# Patient Record
Sex: FEMALE
[De-identification: names, ages, dates, MRNs, and addresses within clinical notes are randomized per-mention and may not be internally consistent; named-entity substitution may affect disease eponyms.]

---

## 2019-12-05 ENCOUNTER — HOSPITAL ENCOUNTER (EMERGENCY)
Dept: HOSPITAL 97 - ER | Age: 59
Discharge: HOME | End: 2019-12-05
Payer: COMMERCIAL

## 2019-12-05 VITALS — DIASTOLIC BLOOD PRESSURE: 78 MMHG | SYSTOLIC BLOOD PRESSURE: 116 MMHG

## 2019-12-05 VITALS — OXYGEN SATURATION: 95 % | TEMPERATURE: 98.4 F

## 2019-12-05 DIAGNOSIS — F17.210: ICD-10-CM

## 2019-12-05 DIAGNOSIS — M79.652: Primary | ICD-10-CM

## 2019-12-05 DIAGNOSIS — Z88.5: ICD-10-CM

## 2019-12-05 PROCEDURE — 93970 EXTREMITY STUDY: CPT

## 2019-12-05 PROCEDURE — 99283 EMERGENCY DEPT VISIT LOW MDM: CPT

## 2019-12-05 PROCEDURE — 81003 URINALYSIS AUTO W/O SCOPE: CPT

## 2019-12-05 NOTE — XMS REPORT
Summary of Care

 Created on:2019



Patient:Tena Sanz

Sex:Female

:1960

External Reference #:[id number]





Demographics







 Address  [address] [place] [address]

 

 Mobile Phone  [phone]

 

 Home Phone  [phone]

 

 Work Phone  [phone]

 

 Email Address  [email or web address]

 

 Preferred Language  English

 

 Marital Status  

 

 Nondenominational Affiliation  Unknown

 

 Race  White

 

 Ethnic Group  Not  or 









Author







 Organization  [facility]

 

 Address  [address] [place] [address]









Support







 Name  Relationship  Address  Phone

 

 Owen Sanz  Unavailable  Unavailable  [phone]









Care Team Providers







 Name  Role  Phone

 

 Pcp, Patient Does Not Have A  Primary Care Provider  [phone]









Reason for Visit







 Reason  Comments

 

 Leg Pain  



Auth/Cert





 Status  Reason  Specialty  Diagnoses /  Referred By  Referred To



       Procedures  Contact  Contact

 

     Emergency Medicine  Diagnoses



GROIN PAIN INTO LEG    [facility]



           Dept







           [address] [place] [address]



           [place]



           [address]







           Phone:



           [phone]







           Fax: [phone]









Encounter Details







 Date  Type  Department  Care Team  Description

 

 2019  Emergency  [facility]  Obed Rinaldi III,  Sprain of groin,



     Department



  PA



  initial encounter



     [address] [place] [address] 



  [address] [place] [address] 



  (Primary Dx)



     [place] [address]



  [place], [place] [address] [phone] (Fax)  







Allergies







 Active Allergy  Reactions  Severity  Noted Date  Comments

 

 Naproxen Sodium  Nausea and/or Vomiting    2015  

 

 Codeine  Nausea and/or Vomiting    2015  



documented as of this encounter (statuses as of 2019)



Medications







 Medication  Sig  Dispensed  Refills  Start Date  End Date  Status

 

 doxycycline  Take 1 Cap by  20 Cap  0  2015    Active



 (VIBRAMYCIN) 100 mg  mouth 2 (two)          



 capsule  times daily.          

 

 albuterol (VENTOLIN)  Inhale 2 Puffs  1 Inhaler  0  2015    Active



 90 mcg/actuation  every 4 (four)          



 inhaler  hours as          



   needed for          



   Wheezing or          



   Shortness of          



   Breath.          

 

 albuterol (PROAIR)  Inhale 2 Puffs  8.5 g  3  2016    Active



 90 mcg/actuation  every 4 (four)          



 inhaler  hours as          



   needed for          



   Wheezing,          



   Shortness of          



   Breath or          



   Bronchospasm.          

 

 benzonatate 100 mg  Take 1 capsule  20 capsule  0  2018    Active



 capsule  by mouth 3          



   (three) times          



   daily as          



   needed for          



   Cough.          

 

 albuterol 2.5 mg /3  Inhale 3 mL  1 Box  0  2018    Active



 mL (0.083 %)  every 4 (four)          



 nebulizer solution  hours as          



   needed for          



   Wheezing or          



   Shortness of          



   Breath.          

 

 albuterol 90  Inhale 2 Puffs  8.5 g  0  2018    Active



 mcg/actuation  every 4 (four)          



 inhaler  hours as          



   needed for          



   Wheezing or          



   Shortness of          



   Breath.          

 

 predniSONE 10 mg  Take 4 tablets  12 tablet  0  2019  Active



 tabletIndications:  by mouth daily        9  



 Sprain of groin,  for 3 days.          



 initial encounter            

 

 traMADol (ULTRAM) 50  Take 1 tablet  9 tablet  0  2019    Active



 mg  by mouth every          



 tabletIndications:  8 (eight)          



 Sprain of groin,  hours as          



 initial encounter  needed for          



   Pain (scale          



   4-6).          

 

 acetaminophen  Take 3 tablets  60 tablet  0  2019  Active



 (TYLENOL) 325 mg  by mouth 4        9  



 tabletIndications:  (four) times          



 Sprain of groin,  daily for 5          



 initial encounter  days. This is          



   the maximum          



   safe dose for          



   a healthy          



   adult.          

 

 cyclobenzaprine 5 mg  Take 1 tablet  9 tablet  0  2019    Active



 tabletIndications:  by mouth 3          



 Sprain of groin,  (three) times          



 initial encounter  daily.          

 

 predniSONE 20 mg  1 PO BID x 4  8 tablet  0  2018  
Discontinued



 tablet  days        9  



documented as of this encounter (statuses as of 2019)



Active Problems

No known active problemsdocumented as of this encounter (statuses as of 2019)



Social History







 Tobacco Use  Types  Packs/Day  Years Used  Date

 

 Never Assessed        









 Sex Assigned at Birth  Date Recorded

 

 Not on file  









 Job Start Date  Occupation  Industry

 

 Not on file  Not on file  Not on file









 Travel History  Travel Start  Travel End









 No recent travel history available.



documented as of this encounter



Last Filed Vital Signs







 Vital Sign  Reading  Time Taken  Comments

 

 Blood Pressure  129/91  2019  4:45 PM CDT  

 

 Pulse  84  2019  4:45 PM CDT  

 

 Temperature  36.4 C (97.5 F)  2019  4:45 PM CDT  

 

 Respiratory Rate  18  2019  4:45 PM CDT  

 

 Oxygen Saturation  99%  2019  4:45 PM CDT  

 

 Inhaled Oxygen Concentration  -  -  

 

 Weight  74.8 kg (165 lb)  2019  4:45 PM CDT  

 

 Height  -  -  

 

 Body Mass Index  29.23  2018  1:46 PM CDT  



documented in this encounter



Discharge Instructions

Obed Sanchez III, [place] - 2019Formatting of this note might 
be different from the original.





@@@@@@@@@@@@@@@@@@@@@@@@@@@@@@@@@@@@@@@@@@@@@@@@@@@@@

[facility]

RETURN TO WORK / SCHOOL EXCUSE



Tena Sanz WAS SEEN IN THE ER AND DISCHARGED 2019

TODAY, 4:55 PM  &amp;

May return to Work / School / Incarceration on  with No limitations 
unless indicated below.

___The following limitations apply until pt is seen by Physician and cleared to 
return to normal activity.

___ Light duty

___ No Sports

___ No work

___ Do not return until fever free for 24 hours.

___ No school



Ed Gentry [place]

[facility]

[address] [place] [address] [place] [address]



If you are unprepared to return to work tomorrow due to pain please give this 
note to your employer and make a follow up appointment with your MD for further 
evaluation and limitations.



###  The patient may have been given Narcotic pain medications during their 
stay in the ED that may show up on a Drug Screen. The hospital discharge paper 
work will identify these medications.



@@@@@@@@@@@@@@@@@@@@@@@@@@@@@@@@@@@@@@@@@@@@@@@@@@@@@



Thank you for trusting us with your care.



The emergency room is the first stop in the medical management of your 
complaint .



Our primary pupose is to identify life threatening emergancies and to rapidly 
address those issues.



We are releasing you today after evaluation for emergency or life threatening 
problems related to your complaint. At this time we are comfortable that your 
condition is stable enough to go home, take oral medications and follow up for 
further care.



If you can't afford a doctor OR MEDICATIONS consider

Clinic

[facility], [address], [place]; [phone]



Medications

[facility] WILL SHOW YOU WHERE YOU CAN GET YOUR MEDICATIONS CHEAPEST.



1. Call your doctor and let them know you were seen for

  ICD-10-CM ICD-9-CM

1. Sprain of groin, initial encounter S33.8XXA 848.8



2. Schedule a follow up  within 3 days of your ER visit.



3. Take your prescriptions to the pharmacy and get them filled today.



4. Take the medications as prescribed and until completed.



5. You have been referred for further care



6. You may need additional tests Your doctors will help you figure out what you 
need and how to get them done.



7. Please read all paperwork provided to you.



Additional instructions

See Attached





AttachmentsThe following attachments cannot be sent through Care 
Everywhere.Getting Into and Out of Bed, Back Safety (English)Strains and Sprains
, Treating (English)documented in this encounter



Plan of Treatment







 Health Maintenance  Due Date  Last Done  Comments

 

 HEPATITIS C (HCV) SCREEN  1960    

 

 PNEUMOCOCCAL 0-64 YEARS COMBINED SERIES (1 of 1 -  1965    



 PPSV23)      

 

 DTaP,Tdap,and Td Vaccines (1 - Tdap)  1978    

 

 PAP SMEAR  1980    

 

 MAMMOGRAM  1999    

 

 COLONOSCOPY  2009    

 

 Zoster Recombinant Vaccine (SHINGRIX) (1 of 2)  2009    

 

 INFLUENZA VACCINE (Retired version)  2019    



documented as of this encounter



Procedures







 Procedure Name  Priority  Date/Time  Associated Diagnosis  Comments

 

 NOTICE OF PRIVACY  Routine  2019  4:45 PM CDT    



 PRACTICES        

 

 CONSENT/REFUSAL FOR  Routine  2019  4:38 PM CDT    



 DIAGNOSIS AND TREATMENT        



documented in this encounter



Results

Not on filedocumented in this encounter



Visit Diagnoses







 Diagnosis

 

 Sprain of groin, initial encounter - Primary



documented in this encounter



Insurance







 Payer  Benefit Plan /  Subscriber ID  Effective Dates  Phone  Address  Type



   Group          

 

 UNITED  UNITED  [id number]  [facility]    t      S









 Guarantor Name  Account Type  Relation to  Date of  Phone  Billing



     Patient  Birth    Address

 

 Tena Sanz  Personal/Family  Self  1960  [phone]  [address]







         (Home)



  [place], [place]



         [phone]  [id number]



         (Work)  



documented as of this encounter

## 2019-12-05 NOTE — XMS REPORT
Patient Summary Document

 Created on:2019



Patient:JOVITA CARR

Sex:Female

:1960

External Reference #:[id number]





Demographics







 Address  [address] [place] [address]

 

 Home Phone  [phone]

 

 Work Phone  [phone]

 

 Preferred Language  Unknown

 

 Marital Status  Unknown

 

 Gnosticism Affiliation  Unknown

 

 Race  Unknown

 

 Additional Race(s)  Unavailable

 

 Ethnic Group  Unknown









Author







 Organization  [facility]

 

 Address  [facility] [place] [address] [place] [address]

 

 Phone  [phone]









Care Team Providers







 Name  Role  Phone

 

 Unavailable  Unavailable  Unavailable









Problems

This patient has no known problems.



Allergies, Adverse Reactions, Alerts

This patient has no known allergies or adverse reactions.



Medications

This patient has no known medications.

## 2019-12-05 NOTE — RAD REPORT
EXAM DESCRIPTION:  USExtrem Venous W Compress Bil12/5/2019 3:01 pm

 

CLINICAL HISTORY:  Leg pain

 

COMPARISON:  none

 

FINDINGS:  The common femoral, superficial femoral, popliteal and posterior tibial veins bilaterally 
are compressible and demonstrate augmentation.

 

Doppler demonstrates good flow.

 

IMPRESSION:  No evidence of deep venous thrombosis involving  either lower extremity.

## 2019-12-05 NOTE — ER
Nurse's Notes                                                                                     

 [facility]                                                                 

Name: Tena Sanz                                                                             

Age: 59 yrs                                                                                       

Sex: Female                                                                                       

: 1960                                                                                   

MRN: [id number]                                                                                   

Arrival Date: 2019                                                                          

Time: 13:22                                                                                       

Account#: [id number]                                                                            

Bed 19                                                                                            

Private MD:                                                                                       

Diagnosis: Low back pain;Pain in left thigh                                                       

                                                                                                  

Presentation:                                                                                     

                                                                                             

14:02 Presenting complaint: Patient states: Right ankle swelling started yesterday, denies    jl7 

      trauma, reports standing a lot at work. Reports left low back pain when standing at         

      work for the past month. Reports left upper thigh pain started this morning. Transition     

      of care: patient was not received from another setting of care. Onset of symptoms was       

      2019. Risk Assessment: Do you want to hurt yourself or someone else?           

      Patient reports no desire to harm self or others. Initial Sepsis Screen: Does the           

      patient meet any 2 criteria? No. Patient's initial sepsis screen is negative. Does the      

      patient have a suspected source of infection? No. Patient's initial sepsis screen is        

      negative. Care prior to arrival: None.                                                      

14:02 Method Of Arrival: Ambulatory                                                           [facility] 

14:02 Acuity: MITCH 4                                                                           jl7 

                                                                                                  

Historical:                                                                                       

- Allergies:                                                                                      

14:04 Codeine;                                                                                jl7 

- Home Meds:                                                                                      

14:04 None [Active];                                                                          jl7 

- PMHx:                                                                                           

14:04 None;                                                                                   jl7 

- PSHx:                                                                                           

14:04 None;                                                                                   jl7 

                                                                                                  

- Immunization history:: Adult Immunizations not up to date.                                      

- Social history:: Smoking status: Patient uses tobacco products, smokes one-half pack            

  cigarettes per day.                                                                             

- Ebola Screening: : No symptoms or risks identified at this time.                                

                                                                                                  

                                                                                                  

Screenin:15 Abuse screen: Denies threats or abuse. Denies injuries from another. Nutritional        ca1 

      screening: No deficits noted. Tuberculosis screening: No symptoms or risk factors           

      identified. Fall Risk Gait- Impaired (20 pts.).                                             

                                                                                                  

Assessment:                                                                                       

14:15 General: Appears in no apparent distress. comfortable, Behavior is calm, cooperative,   ca1 

      appropriate for age. Pain: Complains of pain in left quadriceps, left lower back Pain       

      currently is 7 out of 10 on a pain scale. at worst was 12 out of 10 on a pain scale.        

      Pain began this morning. Pain: Aggravated by weight bearing. Neuro: Level of                

      Consciousness is awake, alert, obeys commands, Oriented to person, place, time,             

      situation, Appropriate for age. Cardiovascular: Heart tones S1 S2 present Capillary         

      refill < 3 seconds Patient's skin is warm and dry. Respiratory: Airway is patent            

      Respiratory effort is even, unlabored, Respiratory pattern is regular, symmetrical,         

      Breath sounds are clear bilaterally. GI: Abdomen is flat, non-distended, Bowel sounds       

      present X 4 quads. Abd is soft and non tender X 4 quads. : Urine is clear. EENT: No       

      deficits noted. No signs and/or symptoms were reported regarding the EENT system. Derm:     

      Skin is intact, is healthy with good turgor, Skin is pink, warm \T\ dry. Musculoskeletal:   

      Circulation, motion, and sensation intact. Capillary refill < 3 seconds, Swelling           

      present in right ankle, medial aspect of right foot, right knee and right shin.             

14:56 Reassessment: Patient appears in no apparent distress at this time. Patient is alert,   ca1 

      oriented x 3, equal unlabored respirations, skin warm/dry/pink. Ultrasound at bedside.      

                                                                                                  

Vital Signs:                                                                                      

14:04  / 87; Pulse 88; Resp 19 S; Temp 98.4(O); Pulse Ox 95% on R/A; Weight 74.84 kg    jl7 

      (R); Height 5 ft. 3 in. (160.02 cm) (R); Pain 6/10;                                         

14:55  / 78; Pulse 75; Pulse Ox 95% on R/A;                                             jb1 

14:04 Body Mass Index 29.23 (74.84 kg, 160.02 cm)                                             jl7 

                                                                                                  

ED Course:                                                                                        

13:22 Patient arrived in ED.                                                                  as  

14:04 Triage completed.                                                                       jl7 

14:04 Arm band placed on right wrist. Patient placed in an exam room, on a stretcher.         jl7 

14:06 María Elena Alatorre FNP-C is PHCP.                                                        kb  

14:06 Bryce Nieto MD is Attending Physician.                                                kb  

14:15 Patient has correct armband on for positive identification. Bed in low position. Call   ca1 

      light in reach. Side rails up X 1. Pulse ox on. NIBP on. Warm blanket given.                

14:15 No provider procedures requiring assistance completed.                                  ca1 

14:17 Viki Love, RN is Primary Nurse.                                                      ca1 

15:06 US Extremity Venous W Compression Jamie In Process Unspecified.                           EDMS

15:18 Patient did not have IV access during this emergency room visit.                        ca1 

                                                                                                  

Administered Medications:                                                                         

No medications were administered                                                                  

                                                                                                  

                                                                                                  

Outcome:                                                                                          

15:14 Discharge ordered by MD. sanchez  

15:23 Discharged to home ambulatory.                                                          ca1 

15:23 Condition: stable                                                                           

15:23 Discharge instructions given to patient, Instructed on discharge instructions, follow       

      up and referral plans. Demonstrated understanding of instructions, follow-up care.          

15:31 Patient left the ED.                                                                    ca1 

                                                                                                  

Signatures:                                                                                       

Dispatcher [employer]                           EDMS                                                 

Zurdo Hernandez                                 jbMaría Elena [place], FNP-C                 FNP-Maureen Scott Jahala, RN                        RN   jl7                                                  

Viki Love RN                        RN   ca1                                                  

                                                                                                  

**************************************************************************************************

## 2019-12-05 NOTE — EDPHYS
Physician Documentation                                                                           

 [facility]                                                                 

Name: Tena Sanz                                                                             

Age: 59 yrs                                                                                       

Sex: Female                                                                                       

: 1960                                                                                   

MRN: [id number]                                                                                   

Arrival Date: 2019                                                                          

Time: 13:22                                                                                       

Account#: [id number]                                                                            

Bed 19                                                                                            

Private MD:                                                                                       

ED Physician Bryce Nieto                                                                         

HPI:                                                                                              

                                                                                             

15:12 This 59 yrs old  Female presents to ER via Ambulatory with complaints of Leg    kb  

      Swelling, Ankle Swelling, Low Back Pain.                                                    

15:12 The patient has not recently seen a physician.                                          kb  

15:12 The patient presents with pain, that is acute, swelling. The complaints affect the left kb  

      quadriceps, right ankle. Context: the patient can fully bear weight, the patient is         

      able to ambulate. Onset: The symptoms/episode began/occurred this morning. Modifying        

      factors: The symptoms are alleviated by nothing. the symptoms are aggravated by             

      standing at work for a long time. Associated signs and symptoms: Pertinent positives:       

      swelling. Treatment prior to arrival includes: no previous treatment. Severity of           

      symptoms: At their worst the symptoms were mild, in the emergency department the            

      symptoms are unchanged. The patient has not experienced similar symptoms in the past.       

      Pt reports right ankle swelling for "a while," left thigh pain that started this            

      morning and left lower back pain that only happens when standing at work. Denies any        

      injury. Has had ankle checked out before and everything was fine.                           

                                                                                                  

Historical:                                                                                       

- Allergies:                                                                                      

14:04 Codeine;                                                                                jl7 

- Home Meds:                                                                                      

14:04 None [Active];                                                                          jl7 

- PMHx:                                                                                           

14:04 None;                                                                                   jl7 

- PSHx:                                                                                           

14:04 None;                                                                                   jl7 

                                                                                                  

- Immunization history:: Adult Immunizations not up to date.                                      

- Social history:: Smoking status: Patient uses tobacco products, smokes one-half pack            

  cigarettes per day.                                                                             

- Ebola Screening: : No symptoms or risks identified at this time.                                

                                                                                                  

                                                                                                  

ROS:                                                                                              

15:10 Constitutional: Negative for fever, chills, and weight loss, ENT: Negative for injury,  kb  

      pain, and discharge, Neck: Negative for injury, pain, and swelling, Cardiovascular:         

      Negative for chest pain, palpitations, and edema, Respiratory: Negative for shortness       

      of breath, cough, wheezing, and pleuritic chest pain, Abdomen/GI: Negative for              

      abdominal pain, nausea, vomiting, diarrhea, and constipation, : Negative for injury,      

      bleeding, discharge, and swelling, Skin: Negative for injury, rash, and discoloration,      

      Neuro: Negative for headache, weakness, numbness, tingling, and seizure.                    

15:10 Back: Positive for of the right low back, pain when standing at work.                       

15:10 MS/extremity: Positive for pain, of the left quadriceps.                                    

15:11 MS/extremity: Positive for swelling, of the right ankle.                                kb  

                                                                                                  

Exam:                                                                                             

15:10 Constitutional:  This is a well developed, well nourished patient who is awake, alert,  kb  

      and in no acute distress. Head/Face:  Normocephalic, atraumatic. Chest/axilla:  Normal      

      chest wall appearance and motion.  Nontender with no deformity.  No lesions are             

      appreciated. Cardiovascular:  Regular rate and rhythm with a normal S1 and S2.  No          

      gallops, murmurs, or rubs.  Normal PMI, no JVD.  No pulse deficits. Respiratory:  Lungs     

      have equal breath sounds bilaterally, clear to auscultation and percussion.  No rales,      

      rhonchi or wheezes noted.  No increased work of breathing, no retractions or nasal          

      flaring. Abdomen/GI:  Soft, non-tender, with normal bowel sounds.  No distension or         

      tympany.  No guarding or rebound.  No evidence of tenderness throughout. Skin:  Warm,       

      dry with normal turgor.  Normal color with no rashes, no lesions, and no evidence of        

      cellulitis. MS/ Extremity:  Pulses equal, no cyanosis.  Neurovascular intact.  Full,        

      normal range of motion. Neuro:  Awake and alert, GCS 15, oriented to person, place,         

      time, and situation.  Cranial nerves II-XII grossly intact.  Motor strength 5/5 in all      

      extremities.  Sensory grossly intact.  Cerebellar exam normal.  Normal gait.                

15:10 Back: pain, that is mild, of the  right low back.                                           

                                                                                                  

Vital Signs:                                                                                      

14:04  / 87; Pulse 88; Resp 19 S; Temp 98.4(O); Pulse Ox 95% on R/A; Weight 74.84 kg    7 

      (R); Height 5 ft. 3 in. (160.02 cm) (R); Pain 6/10;                                         

14:55  / 78; Pulse 75; Pulse Ox 95% on R/A;                                             jb1 

14:04 Body Mass Index 29.23 (74.84 kg, 160.02 cm)                                             jl7 

                                                                                                  

MDM:                                                                                              

14:06 Patient medically screened.                                                             kb  

15:10 Data reviewed: vital signs, nurses notes. Data interpreted: Pulse oximetry: on room air kb  

      is 95 %. Interpretation: normal. Counseling: I had a detailed discussion with the           

      patient and/or guardian regarding: the historical points, exam findings, and any            

      diagnostic results supporting the discharge/admit diagnosis, radiology results, the         

      need for outpatient follow up, a family practitioner, to return to the emergency            

      department if symptoms worsen or persist or if there are any questions or concerns that     

      arise at home.                                                                              

                                                                                                  

                                                                                             

14:30 Order name: Urine Dipstick--Ancillary (enter results); Complete Time: 14:57             em1 

                                                                                             

14:27 Order name: US Extremity Venous W Compression Jamie                                       kb  

                                                                                             

14:30 Order name: Urine Dipstick-Ancillary (obtain specimen); Complete Time: 14:30            em1 

                                                                                                  

Administered Medications:                                                                         

No medications were administered                                                                  

                                                                                                  

                                                                                                  

Disposition:                                                                                      

17:21 Co-signature as Attending Physician, Bryce Nieto MD.                                    rn  

                                                                                                  

Disposition:                                                                                      

19 15:14 Discharged to Home. Impression: Low back pain, Pain in left thigh.                 

- Condition is Stable.                                                                            

- Discharge Instructions: Musculoskeletal Pain, Back Injury Prevention, Easy-to-Read,             

  Back Pain, Adult, Easy-to-Read, Back Exercises, Easy-to-Read.                                   

                                                                                                  

- Medication Reconciliation Form, Thank You Letter, Antibiotic Education, Prescription            

  Opioid Use, Work release form form.                                                             

- Follow up: Emergency Department; When: As needed; Reason: Worsening of condition.               

  Follow up: Private Physician; When: 2 - 3 days; Reason: Recheck today's complaints,             

  Continuance of care, Re-evaluation by your physician.                                           

                                                                                                  

                                                                                                  

                                                                                                  

Signatures:                                                                                       

Dispatcher [employer]                           EDMS                                                 

María Elena Alatorre, FNP-C                 FNP-Ckb                                                   

Bryce Nieto MD MD rn Martinez, Eric                               em1                                                  

Angela Kenyon RN                        RN   jl7                                                  

Viki Love RN                        RN   ca1                                                  

                                                                                                  

Corrections: (The following items were deleted from the chart)                                    

15:31 15:14 2019 15:14 Discharged to Home. Impression: Low back pain; Pain in left      ca1 

      thigh. Condition is Stable. Forms are Medication Reconciliation Form, Thank You Letter,     

      Antibiotic Education, Prescription Opioid Use. Follow up: Emergency Department; When:       

      As needed; Reason: Worsening of condition. Follow up: Private Physician; When: 2 - 3        

      days; Reason: Recheck today's complaints, Continuance of care, Re-evaluation by your        

      physician. kb                                                                               

                                                                                                  

**************************************************************************************************